# Patient Record
Sex: FEMALE | Employment: FULL TIME
[De-identification: names, ages, dates, MRNs, and addresses within clinical notes are randomized per-mention and may not be internally consistent; named-entity substitution may affect disease eponyms.]

---

## 2017-10-15 ENCOUNTER — HEALTH MAINTENANCE LETTER (OUTPATIENT)
Age: 47
End: 2017-10-15

## 2018-01-03 ENCOUNTER — OFFICE VISIT (OUTPATIENT)
Dept: NEUROPSYCHOLOGY | Facility: CLINIC | Age: 48
End: 2018-01-03
Payer: COMMERCIAL

## 2018-01-03 DIAGNOSIS — F54 PSYCHOSOMATIC FACTOR IN PHYSICAL CONDITION: ICD-10-CM

## 2018-01-03 DIAGNOSIS — F09 COGNITIVE DISORDER: Primary | ICD-10-CM

## 2018-01-03 NOTE — MR AVS SNAPSHOT
After Visit Summary   1/3/2018    Radha Ling    MRN: [id number]           Patient Information     Date Of Birth          1970        Visit Information        Provider Department      1/3/2018 8:30 AM Katelyn King PsyD [facility]        Today's Diagnoses     Cognitive disorder    -  1    Psychosomatic factor in physical condition           Follow-ups after your visit        Who to contact     Please call your clinic at [phone] to:    Ask questions about your health    Make or cancel appointments    Discuss your medicines    Learn about your test results    Speak to your doctor   If you have compliments or concerns about an experience at your clinic, or if you wish to file a complaint, please contact [facility] Patient Relations at [phone] or email us at [email or web address]         Additional Information About Your Visit        MyChart Information     [employer] gives you secure access to your electronic health record. If you see a primary care provider, you can also send messages to your care team and make appointments. If you have questions, please call your primary care clinic.  If you do not have a primary care provider, please call [phone] and they will assist you.      [employer] is an electronic gateway that provides easy, online access to your medical records. With [employer], you can request a clinic appointment, read your test results, renew a prescription or communicate with your care team.     To access your existing account, please contact your [facility] or call [phone] for assistance.        Care EveryWhere ID     This is your Care EveryWhere ID. This could be used by other organizations to access your [facility] medical records  [id number]         Blood Pressure from Last 3 Encounters:   07/11/12 130/87   11/18/11 126/78   11/14/11 137/87    Weight from Last 3 Encounters:    07/11/12 68.5 kg (151 lb)   11/18/11 73 kg (161 lb)   11/14/11 72.1 kg (159 lb)              We Performed the Following     [id number] TESTING, PER HR/PSYCHOLOGIST     NEUROPSYCH TESTING BY [facility]        Primary Care Provider Office Phone # Fax #    Juan Miguel Mckeon [facility] [phone] [address] [facility] [address]        Equal Access to Services     HAYLIE BELLAMY : Hadii aad ku hadasho Soomaali, waaxda luqadaha, qaybta kaalmada adeegyada, waxay idiin hayaan adewest celestedomeniccelena solis. So [facility] [phone].    ATENCIÓN: Si gene dewitt, tiene a wright disposición servicios gratuitos de asistencia lingüística. Llame al [phone].    We comply with applicable federal civil rights laws and [place] laws. We do not discriminate on the basis of race, color, national origin, age, disability, sex, sexual orientation, or gender identity.            Thank you!     Thank you for choosing [facility] NEUROPSYCHOLOGY  for your care. Our goal is always to provide you with excellent care. Hearing back from our patients is one way we can continue to improve our services. Please take a few minutes to complete the written survey that you may receive in the mail after your visit with us. Thank you!             Your Updated Medication List - Protect others around you: Learn how to safely use, store and throw away your medicines at www.disposemymeds.org.          This list is accurate as of: 1/3/18 11:59 PM.  Always use your most recent med list.                   Brand Name Dispense Instructions for use Diagnosis    ALLEGRA PO      Take  by mouth as needed.        * citalopram 20 MG tablet    celeXA    30 tablet    Take 1 tablet by mouth daily. Take 1/2 tablet (10 mg) for 1-2 weeks, then increase to 1 tablet orally daily    BAILEE (generalised anxiety disorder)       * celeXA 20 MG tablet   Generic drug:  citalopram      Take 20 mg by mouth daily.        CIPRO 500 MG tablet   Generic drug:   ciprofloxacin      Take 750 mg by mouth 2 times daily. For 10 days        EXCEDRIN MIGRAINE PO      Take  by mouth at onset of headache.        ibuprofen 200 MG tablet    ADVIL/MOTRIN     Take 200 mg by mouth every 4 hours as needed. Alternating with the ultracet        IMITREX SC      Inject  Subcutaneous at onset of headache.        metroNIDAZOLE 500 MG tablet    FLAGYL    14 tablet    Take 1 tablet by mouth 2 times daily.    Diverticulitis of colon       ondansetron 4 MG ODT tab    ZOFRAN ODT    20 tablet    Take 1-2 tablets by mouth every 8 hours as needed for nausea.    Nausea       PROBIOTIC PO      Take  by mouth.        ZOFRAN 4 MG tablet   Generic drug:  ondansetron      Take 4 mg by mouth every 6 hours as needed.        * Notice:  This list has 2 medication(s) that are the same as other medications prescribed for you. Read the directions carefully, and ask your doctor or other care provider to review them with you.

## 2018-01-03 NOTE — NURSING NOTE
The patient was seen for neuropsychological evaluation at the request of [facility] for the purpose of diagnostic clarification and treatment planning.  2 hours of face to face testing were provided by this writer.  Please see Dr. Katelyn King's report for a full interpretation of the findings.
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
## 2018-01-17 NOTE — PROGRESS NOTES
[facility] ADULT INTELLIGENCE SCALE-IV TRAIL MAKING TEST                             Scaled Score     Block Design    9    Time Errors   Digit Span  12    A  27            0    Vocabulary    7    B  63            0    Arithmetic  11      Symbol Search    9   CONTROLLED WORD ASSOCIATION TEST   Coding    9      Score   40    [facility] MEMORY SCALES     LETTER CANCELLATION TEST   Logical Mem Immediate  7/8     Logical Mem 30 Minute  5/4   Time   159   Errors     0    Visual Repr Immediate   7     Visual Repr 30    6  PSYCHOMOTOR TESTS   30 Minute Recognition   3       Right    Left   [facility] AUDITORY VERBAL LEARNING TEST Finger Tapping   32    33     Grooved Pegboard   67      61       I  II  III IV  V VI VII  Drops: 0           Drops: 1       7    9       9       10        11        7      9       [place] NAMING TEST    30 Minute Recall  10      30 Minute Recognition  13  Score  51    Intrusions          0     [place] CARD SORTING TEST- 1 deck TEST OF SUSTAINED ATTENTION & TRACKING           # of categories   5   Total Time          105           Total Errors     0         TOMM    Trial 1  47     Trial 2    49

## 2020-02-24 ENCOUNTER — HEALTH MAINTENANCE LETTER (OUTPATIENT)
Age: 50
End: 2020-02-24

## 2020-12-13 ENCOUNTER — HEALTH MAINTENANCE LETTER (OUTPATIENT)
Age: 50
End: 2020-12-13

## 2021-01-20 ENCOUNTER — VIRTUAL VISIT (OUTPATIENT)
Dept: FAMILY MEDICINE | Facility: CLINIC | Age: 51
End: 2021-01-20
Payer: COMMERCIAL

## 2021-01-20 DIAGNOSIS — F43.23 SITUATIONAL MIXED ANXIETY AND DEPRESSIVE DISORDER: Primary | ICD-10-CM

## 2021-01-20 PROCEDURE — 99203 OFFICE O/P NEW LOW 30 MIN: CPT | Mod: 95 | Performed by: NURSE PRACTITIONER

## 2021-01-20 RX ORDER — CITALOPRAM HYDROBROMIDE 20 MG/1
20 TABLET ORAL DAILY
Qty: 30 TABLET | Refills: 0 | Status: SHIPPED | OUTPATIENT
Start: 2021-01-20 | End: 2021-02-10 | Stop reason: SINTOL

## 2021-01-20 ASSESSMENT — ENCOUNTER SYMPTOMS
FATIGUE: 1
CHILLS: 0
DECREASED CONCENTRATION: 1
NERVOUS/ANXIOUS: 1
COUGH: 0
FEVER: 0
SHORTNESS OF BREATH: 0
APPETITE CHANGE: 0
SLEEP DISTURBANCE: 0

## 2021-01-20 NOTE — PROGRESS NOTES
Radha is a 50 year old who is being evaluated via a billable telephone visit.      What phone number would you like to be contacted at?  [phone] (Mobile)  How would you like to obtain your AVS? Mail a copy  Assessment & Plan     1. Situational mixed anxiety and depressive disorder  - advised ED if develops SI/HI.  - continue with family addiction counselor.  - citalopram (CELEXA) 20 MG tablet; Take 1 tablet (20 mg) by mouth daily  Dispense: 30 tablet; Refill: 0      Return in about 4 weeks (around 2/17/2021).    TAWANNA Grijalva CNP  [facility] [place]    Subjective     Radha is a 50 year old who presents to clinic today for the following health issues     HPI     Radha reports she has been battling a lot of situational anxiety and depression.  Reports for 3 years her  hit that he was using Meth.  He was fired from his job in July and disappeared for 1 month.  Since that time he has been in and out of her and their childrens's life.  Reports he has asked for a divorce and then wants to save their relationship.  She filed for divorce in November but this is not final yet.  She has requested an order of protection and this was denied.  Reports he continues to harass her and their kids.  She has gotten her own house but her kids are not living with her.  Reports lots of stress and hard to focus when at work.  Her hair is thinning.  She is eating regular meals and sleeping fairly well with Melatonin.  She has been seeing a family addiction counselor weekly since August and this has been very helpful.  She denies SI/HI.  She took Celexa years ago and denies any side effects.  Interested in restarting it.        Review of Systems   Constitutional: Positive for fatigue. Negative for appetite change, chills and fever.   Respiratory: Negative for cough and shortness of breath.    Cardiovascular: Negative for chest pain.   Psychiatric/Behavioral: Positive for decreased concentration.  Negative for self-injury, sleep disturbance and suicidal ideas. The patient is nervous/anxious.             Objective           Vitals:  No vitals were obtained today due to virtual visit.    Physical Exam   healthy, alert and no distress  PSYCH: Alert and oriented times 3; coherent speech, normal   rate and volume, able to articulate logical thoughts, able   to abstract reason, no tangential thoughts, no hallucinations   or delusions  Her affect is flat  RESP: No cough, no audible wheezing, able to talk in full sentences  Remainder of exam unable to be completed due to telephone visits                Phone call duration: 14 minutes

## 2021-02-05 ENCOUNTER — TELEPHONE (OUTPATIENT)
Dept: FAMILY MEDICINE | Facility: CLINIC | Age: 51
End: 2021-02-05

## 2021-02-05 NOTE — TELEPHONE ENCOUNTER
Called patient and gave providers message/ instructions.  Patient states (s)he understands this.   Patient prefers to talk to Sravani Hudson NP.  Made appointment to do video visit with Sravani Hudson NP 2/10/21.    Bree BLACKMANN, RN

## 2021-02-05 NOTE — TELEPHONE ENCOUNTER
Patient had been on citalopram (CELEXA) 20 MG tablet    new medication and it gave terrible headache, nearly made her pass out. Did try 1/2 tablet and that still gave her headaches. She threw the medication away. She has not been feeling good not able to sleep. Patient is not able to function at work. Anxiety is off the charts. Please call patient to advise.   SAILAJA Hart

## 2021-02-05 NOTE — TELEPHONE ENCOUNTER
Please let Radah know that I recommend she have an appt (Video/Telephone) if she is not able to come in the clinic.  I do not have any opening today.  Please see what is available next week with myself or another provider.  If she cannot wait, I recommend Urgent Care.    TAWANNA Grijalva CNP

## 2021-02-08 NOTE — PROGRESS NOTES
"Radha is a 50 year old who is being evaluated via a billable video visit.      How would you like to obtain your AVS? MyChart  If the video visit is dropped, the invitation should be resent by: Text to cell phone: [phone]  Will anyone else be joining your video visit? No    Video Start Time: 10:46  Assessment & Plan     1. Situational mixed anxiety and depressive disorder  - discontinue Celexa d/t side effects. Continue seeing Addiction Specialist Therapist.  She is wanting to try a holistic approach to management and will contact the office in the future if things change or has questions/concerns.   - CBC with platelets; Future  - Comprehensive metabolic panel; Future  - TSH with free T4 reflex; Future  - Vitamin D Deficiency; Future        20 minutes spent on the date of the encounter doing chart review, history and exam, documentation and further activities as noted above      Return in about 4 weeks (around 3/10/2021) for worsening symptoms or failure to improve.    TAWANNA Grijalva [facility] [place]    Subjective     Radha is a 50 year old who presents to clinic today for the following health issues      HPI     Anxiety/Depresson-- I saw Radha 3 weeks ago via Virtual Telephone Visit for anxiety and depression.  She has been under a lot of stress in dealing with her  who had been hiding that he was using Meth for the past 3 years.  She has requested a protection order to keep him from contacting her but it was denied.  She has been seeing a family addition counselor weekly since August, which has been helpful.  Radha was restarted on Celexa 20 mg as she had reported taking Celexa in the past and did not recall any bad s/e.      Today she reports ongoing anxiety and stress.  Reports after starting the Celexa she started to feel \"ill\".  States after she took the first dose she felt hot/sweaty and felt like she was going to pass out.  Reports similar symptoms the " following night after the 2nd dose.  She tried taking 1/2 dose and still did not feel well.   She has stopped the medication.  Reports hair thinning.  Reports at this time she is not interested in trying other medications and prefers to try meditation, exercise, music, a more holistic approach to management.  She is planning to continue to see the therapist and proceed with a divorce.        Review of Systems   Constitutional: Positive for fatigue.   Respiratory: Negative for cough and shortness of breath.    Cardiovascular: Negative for chest pain.   Psychiatric/Behavioral: Positive for decreased concentration. Negative for self-injury, sleep disturbance and suicidal ideas. The patient is nervous/anxious.             Objective           Vitals:  No vitals were obtained today due to virtual visit.    Physical Exam   GENERAL: Healthy, alert and no distress  EYES: Eyes grossly normal to inspection.  No discharge or erythema, or obvious scleral/conjunctival abnormalities.  RESP: No audible wheeze, cough, or visible cyanosis.  No visible retractions or increased work of breathing.    SKIN: Visible skin clear. No significant rash, abnormal pigmentation or lesions.  NEURO: Cranial nerves grossly intact.  Mentation and speech appropriate for age.  PSYCH: Mentation appears normal, affect normal/bright, judgement and insight intact, normal speech and appearance well-groomed.                Video-Visit Details    Type of service:  Video Visit    Video End Time:11:00 AM    Originating Location (pt. Location): Home    Distant Location (provider location):  [facility]     Platform used for Video Visit: Mintigo

## 2021-02-10 ENCOUNTER — VIRTUAL VISIT (OUTPATIENT)
Dept: FAMILY MEDICINE | Facility: CLINIC | Age: 51
End: 2021-02-10
Payer: COMMERCIAL

## 2021-02-10 DIAGNOSIS — F43.23 SITUATIONAL MIXED ANXIETY AND DEPRESSIVE DISORDER: Primary | ICD-10-CM

## 2021-02-10 PROCEDURE — 99213 OFFICE O/P EST LOW 20 MIN: CPT | Mod: 95 | Performed by: NURSE PRACTITIONER

## 2021-02-10 ASSESSMENT — ENCOUNTER SYMPTOMS
NERVOUS/ANXIOUS: 1
COUGH: 0
DECREASED CONCENTRATION: 1
SHORTNESS OF BREATH: 0
SLEEP DISTURBANCE: 0
FATIGUE: 1

## 2021-02-12 DIAGNOSIS — F43.23 SITUATIONAL MIXED ANXIETY AND DEPRESSIVE DISORDER: ICD-10-CM

## 2021-02-12 LAB
ALBUMIN SERPL-MCNC: 4.2 G/DL (ref 3.4–5)
ALP SERPL-CCNC: 54 U/L (ref 40–150)
ALT SERPL W P-5'-P-CCNC: 23 U/L (ref 0–50)
ANION GAP SERPL CALCULATED.3IONS-SCNC: 9 MMOL/L (ref 3–14)
AST SERPL W P-5'-P-CCNC: 13 U/L (ref 0–45)
BILIRUB SERPL-MCNC: 0.4 MG/DL (ref 0.2–1.3)
BUN SERPL-MCNC: 10 MG/DL (ref 7–30)
CALCIUM SERPL-MCNC: 9.1 MG/DL (ref 8.5–10.1)
CHLORIDE SERPL-SCNC: 105 MMOL/L (ref 94–109)
CO2 SERPL-SCNC: 26 MMOL/L (ref 20–32)
CREAT SERPL-MCNC: 0.8 MG/DL (ref 0.52–1.04)
ERYTHROCYTE [DISTWIDTH] IN BLOOD BY AUTOMATED COUNT: 13.7 % (ref 10–15)
GFR SERPL CREATININE-BSD FRML MDRD: 86 ML/MIN/{1.73_M2}
GLUCOSE SERPL-MCNC: 102 MG/DL (ref 70–99)
HCT VFR BLD AUTO: 44.3 % (ref 35–47)
HGB BLD-MCNC: 14.7 G/DL (ref 11.7–15.7)
MCH RBC QN AUTO: 29.9 PG (ref 26.5–33)
MCHC RBC AUTO-ENTMCNC: 33.2 G/DL (ref 31.5–36.5)
MCV RBC AUTO: 90 FL (ref 78–100)
PLATELET # BLD AUTO: 242 10E9/L (ref 150–450)
POTASSIUM SERPL-SCNC: 3.4 MMOL/L (ref 3.4–5.3)
PROT SERPL-MCNC: 7.2 G/DL (ref 6.8–8.8)
RBC # BLD AUTO: 4.92 10E12/L (ref 3.8–5.2)
SODIUM SERPL-SCNC: 140 MMOL/L (ref 133–144)
TSH SERPL DL<=0.005 MIU/L-ACNC: 1.86 MU/L (ref 0.4–4)
WBC # BLD AUTO: 8.1 10E9/L (ref 4–11)

## 2021-02-12 PROCEDURE — 80053 COMPREHEN METABOLIC PANEL: CPT | Performed by: NURSE PRACTITIONER

## 2021-02-12 PROCEDURE — 82306 VITAMIN D 25 HYDROXY: CPT | Performed by: NURSE PRACTITIONER

## 2021-02-12 PROCEDURE — 84443 ASSAY THYROID STIM HORMONE: CPT | Performed by: NURSE PRACTITIONER

## 2021-02-12 PROCEDURE — 36415 COLL VENOUS BLD VENIPUNCTURE: CPT | Performed by: NURSE PRACTITIONER

## 2021-02-12 PROCEDURE — 85027 COMPLETE CBC AUTOMATED: CPT | Performed by: NURSE PRACTITIONER

## 2021-02-13 LAB — DEPRECATED CALCIDIOL+CALCIFEROL SERPL-MC: 36 UG/L (ref 20–75)

## 2021-02-21 ENCOUNTER — HEALTH MAINTENANCE LETTER (OUTPATIENT)
Age: 51
End: 2021-02-21

## 2021-04-17 ENCOUNTER — HEALTH MAINTENANCE LETTER (OUTPATIENT)
Age: 51
End: 2021-04-17

## 2021-04-28 ENCOUNTER — TELEPHONE (OUTPATIENT)
Dept: FAMILY MEDICINE | Facility: CLINIC | Age: 51
End: 2021-04-28

## 2021-04-28 NOTE — TELEPHONE ENCOUNTER
Patient Quality Outreach Summary      Summary:    Patient is due/failing the following:   Breast Cancer Screening - Mammogram, Cervical Cancer Screening - PAP Needed and Annual wellness, date due: overdue    Type of outreach:    Sent Med[employer] message.    Questions for provider review:    None                                                                                                                    Kiera Bruno CMA       Chart routed to close.

## 2021-09-26 ENCOUNTER — HEALTH MAINTENANCE LETTER (OUTPATIENT)
Age: 51
End: 2021-09-26

## 2022-03-13 ENCOUNTER — HEALTH MAINTENANCE LETTER (OUTPATIENT)
Age: 52
End: 2022-03-13

## 2022-05-08 ENCOUNTER — HEALTH MAINTENANCE LETTER (OUTPATIENT)
Age: 52
End: 2022-05-08

## 2023-04-23 ENCOUNTER — HEALTH MAINTENANCE LETTER (OUTPATIENT)
Age: 53
End: 2023-04-23

## 2023-06-02 ENCOUNTER — HEALTH MAINTENANCE LETTER (OUTPATIENT)
Age: 53
End: 2023-06-02